# Patient Record
Sex: MALE | Race: WHITE | NOT HISPANIC OR LATINO | Employment: OTHER | URBAN - METROPOLITAN AREA
[De-identification: names, ages, dates, MRNs, and addresses within clinical notes are randomized per-mention and may not be internally consistent; named-entity substitution may affect disease eponyms.]

---

## 2023-12-28 ENCOUNTER — OFFICE VISIT (OUTPATIENT)
Dept: URGENT CARE | Facility: CLINIC | Age: 78
End: 2023-12-28
Payer: MEDICARE

## 2023-12-28 VITALS
RESPIRATION RATE: 19 BRPM | OXYGEN SATURATION: 98 % | WEIGHT: 175 LBS | BODY MASS INDEX: 23.7 KG/M2 | DIASTOLIC BLOOD PRESSURE: 90 MMHG | HEIGHT: 72 IN | SYSTOLIC BLOOD PRESSURE: 171 MMHG | HEART RATE: 65 BPM | TEMPERATURE: 98 F

## 2023-12-28 DIAGNOSIS — S01.81XA FACIAL LACERATION, INITIAL ENCOUNTER: Primary | ICD-10-CM
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
## 2023-12-28 PROCEDURE — 99214 OFFICE O/P EST MOD 30 MIN: CPT | Mod: 25,S$GLB,, | Performed by: INTERNAL MEDICINE

## 2023-12-28 PROCEDURE — 12013 RPR F/E/E/N/L/M 2.6-5.0 CM: CPT | Mod: S$GLB,,, | Performed by: INTERNAL MEDICINE

## 2023-12-28 PROCEDURE — 12013 LACERATION REPAIR: ICD-10-PCS | Mod: S$GLB,,, | Performed by: INTERNAL MEDICINE

## 2023-12-28 PROCEDURE — 99214 PR OFFICE/OUTPT VISIT, EST, LEVL IV, 30-39 MIN: ICD-10-PCS | Mod: 25,S$GLB,, | Performed by: INTERNAL MEDICINE

## 2023-12-28 RX ORDER — VALSARTAN 320 MG/1
320 TABLET ORAL DAILY
COMMUNITY
Start: 2023-11-26

## 2023-12-28 RX ORDER — ROSUVASTATIN CALCIUM 10 MG/1
10 TABLET, COATED ORAL
COMMUNITY
Start: 2023-10-03

## 2023-12-28 RX ORDER — OMEPRAZOLE 40 MG/1
40 CAPSULE, DELAYED RELEASE ORAL
COMMUNITY
Start: 2023-11-28

## 2023-12-28 RX ORDER — TAMSULOSIN HYDROCHLORIDE 0.4 MG/1
1 CAPSULE ORAL
COMMUNITY
Start: 2023-12-13

## 2023-12-28 RX ORDER — METOPROLOL TARTRATE 50 MG/1
50 TABLET ORAL 2 TIMES DAILY
COMMUNITY
Start: 2023-10-03

## 2023-12-28 RX ORDER — FAMOTIDINE 20 MG/1
20 TABLET, FILM COATED ORAL 2 TIMES DAILY
COMMUNITY
Start: 2023-12-25

## 2023-12-28 NOTE — PATIENT INSTRUCTIONS
Pleas return in one week for suture removal.     Go to ER with severe headache, nausea, vomiting, numbness.     Keep covered for next three days, limit sun exposure. Return to clinic with any issues.

## 2023-12-28 NOTE — PROCEDURES
Laceration Repair    Date/Time: 12/28/2023 12:30 PM    Performed by: Tristan Maurice MD  Authorized by: Tristan Maurice MD  Consent Done: Yes  Consent: Verbal consent obtained.  Patient identity confirmed: verbally with patient  Body area: head/neck  Location details: right eyebrow  Laceration length: 3 cm  Foreign bodies: no foreign bodies  Tendon involvement: none  Nerve involvement: none  Vascular damage: no  Anesthesia: local infiltration    Anesthesia:  Local Anesthetic: lidocaine 1% without epinephrine    Patient sedated: no  Irrigation solution: saline  Irrigation method: jet lavage and syringe  Amount of cleaning: standard  Debridement: none  Degree of undermining: none  Skin closure: 5-0 Prolene  Number of sutures: 8  Technique: simple  Approximation: close  Approximation difficulty: simple  Dressing: non-stick sterile dressing and antibiotic ointment  Patient tolerance: Patient tolerated the procedure well with no immediate complications

## 2023-12-28 NOTE — PROGRESS NOTES
Subjective:      Patient ID: Edwin Reyes is a 78 y.o. male.    Vitals:  height is 6' (1.829 m) and weight is 79.4 kg (175 lb). His oral temperature is 98.4 °F (36.9 °C). His blood pressure is 171/90 (abnormal) and his pulse is 65. His respiration is 19 and oxygen saturation is 98%.     Chief Complaint: Head Injury    This is a 78 y.o. male who presents today with a chief complaint of head, facial injury that was sustained 30 minutes ago. He tripped and fell walking in [facility]. He has no headache, no paralysis, no nausea/vomiting, no photophobia or double vision.     Head Injury   The incident occurred less than 1 hour ago. The injury mechanism was a fall. The volume of blood lost was minimal. Quality: burning, tingling. The pain is at a severity of 3/10. The pain is mild. The pain has been constant since the injury. Pertinent negatives include no blurred vision, disorientation, headaches, memory loss, numbness, tinnitus, vomiting or weakness. He has tried nothing for the symptoms. The treatment provided no relief.       HENT:  Negative for tinnitus.    Eyes:  Negative for blurred vision.   Gastrointestinal:  Negative for vomiting.   Skin:  Negative for erythema.   Neurological:  Negative for headaches, disorientation and numbness.   Psychiatric/Behavioral:  Negative for disorientation.       Objective:     Physical Exam   Constitutional: He is oriented to person, place, and time.  Non-toxic appearance. He does not appear ill. No distress. normal  HENT:   Head: Normocephalic. Head is with contusion. Head is without raccoon's eyes, without Lazaro's sign, without right periorbital erythema and without left periorbital erythema.       Ears:   Right Ear: External ear normal.   Left Ear: External ear normal.   Nose: Nose normal.   Mouth/Throat: Mucous membranes are moist.   Laceration in area marked in red, superficial swelling below left orbit. No pain with palpation around orbit. No hemotympanum. EOMI, PERRL. Eyebrow  raise symmetrical.       Comments: Laceration in area marked in red, superficial swelling below left orbit. No pain with palpation around orbit. No hemotympanum. EOMI, PERRL. Eyebrow raise symmetrical.   Eyes: Conjunctivae are normal. Pupils are equal, round, and reactive to light. Right eye exhibits no discharge. Left eye exhibits no discharge. Extraocular movement intact   Pulmonary/Chest: Effort normal. No respiratory distress.   Abdominal: Normal appearance.   Neurological: He is alert and oriented to person, place, and time.   Skin: Skin is not diaphoretic and not pale. No bruising and No erythema jaundice  Psychiatric: His behavior is normal. Mood, judgment and thought content normal.     Speaking in full sentences throughout. No worsening headache or nausea during procedure.     Assessment:     1. Facial laceration, initial encounter    2. Fall, initial encounter        Plan:       Facial laceration, initial encounter  -     Laceration Repair    Fall, initial encounter  -     Laceration Repair